# Patient Record
Sex: MALE | Race: WHITE | NOT HISPANIC OR LATINO | URBAN - METROPOLITAN AREA
[De-identification: names, ages, dates, MRNs, and addresses within clinical notes are randomized per-mention and may not be internally consistent; named-entity substitution may affect disease eponyms.]

---

## 2018-02-02 ENCOUNTER — OFFICE (OUTPATIENT)
Dept: URBAN - METROPOLITAN AREA CLINIC 33 | Facility: CLINIC | Age: 48
End: 2018-02-02

## 2018-02-02 VITALS
HEART RATE: 57 BPM | SYSTOLIC BLOOD PRESSURE: 102 MMHG | HEIGHT: 77 IN | WEIGHT: 213 LBS | DIASTOLIC BLOOD PRESSURE: 64 MMHG | TEMPERATURE: 97.9 F

## 2018-02-02 DIAGNOSIS — R11.2 NAUSEA WITH VOMITING, UNSPECIFIED: ICD-10-CM

## 2018-02-02 DIAGNOSIS — R19.7 DIARRHEA, UNSPECIFIED: ICD-10-CM

## 2018-02-02 PROCEDURE — 99204 OFFICE O/P NEW MOD 45 MIN: CPT

## 2018-02-02 PROCEDURE — 00031: CPT

## 2018-02-02 NOTE — SERVICEHPINOTES
TIARA HAN   is a   47   year old male who is being seen in consultation at the request of   KRYSTEN MCNEAL   for n/v and diarrhea. For years he has had episodes of n/v/d which last a couple days up to a week. He states the only thing that seems to make it go away is IV fluids and zofran. It occurs about once/year and he has 4-5 BMs/day, BSS type 4 and n/v 4x/day. The last episode was around Thanksgiving and then a few weeks afterwards. He has seen many doctors for this and states they all say its "viral". He denies nausea in between episodes. He actually saw us for this issue in 2009--resulting labs, stool studies, and colonoscopy/biopsies were all normal. He reports this has been occurring since he was a kid and was told it may be cyclic vomiting syndrome. He admits to heartburn 1-2x/week. No dysphagia. He has been doing the whole 30 diet with his wife and is currently gluten and dairy free. BMs are now normal, 1-2x/day, BSS type 4. He denies any blood in the stool or hematemesis. He denies caffeine intake, nonsmoker, or regular NSAID use. He was put on Prevacid and tried probiotics for this in the past and has not had any episodes when on either of these (although episodes are so infrequent that cannot correlate to anything specific).

## 2018-02-05 ENCOUNTER — INDEPENDENT LABORATORY (OUTPATIENT)
Dept: URBAN - METROPOLITAN AREA PATHOLOGY 17 | Facility: PATHOLOGY | Age: 48
End: 2018-02-05

## 2018-02-05 ENCOUNTER — OFFICE (OUTPATIENT)
Dept: URBAN - METROPOLITAN AREA CLINIC 32 | Facility: CLINIC | Age: 48
End: 2018-02-05

## 2018-02-05 VITALS
TEMPERATURE: 97.9 F | WEIGHT: 213 LBS | DIASTOLIC BLOOD PRESSURE: 70 MMHG | HEIGHT: 77 IN | DIASTOLIC BLOOD PRESSURE: 63 MMHG | RESPIRATION RATE: 14 BRPM | HEART RATE: 59 BPM | SYSTOLIC BLOOD PRESSURE: 102 MMHG | OXYGEN SATURATION: 97 % | DIASTOLIC BLOOD PRESSURE: 61 MMHG | OXYGEN SATURATION: 98 % | SYSTOLIC BLOOD PRESSURE: 98 MMHG | TEMPERATURE: 97.7 F | OXYGEN SATURATION: 99 % | DIASTOLIC BLOOD PRESSURE: 65 MMHG | HEART RATE: 66 BPM | RESPIRATION RATE: 12 BRPM | HEART RATE: 54 BPM | OXYGEN SATURATION: 96 % | DIASTOLIC BLOOD PRESSURE: 62 MMHG | HEART RATE: 63 BPM | RESPIRATION RATE: 16 BRPM | SYSTOLIC BLOOD PRESSURE: 107 MMHG | HEART RATE: 52 BPM | SYSTOLIC BLOOD PRESSURE: 99 MMHG | SYSTOLIC BLOOD PRESSURE: 104 MMHG | DIASTOLIC BLOOD PRESSURE: 69 MMHG | SYSTOLIC BLOOD PRESSURE: 101 MMHG

## 2018-02-05 DIAGNOSIS — R19.7 DIARRHEA, UNSPECIFIED: ICD-10-CM

## 2018-02-05 DIAGNOSIS — R11.2 NAUSEA WITH VOMITING, UNSPECIFIED: ICD-10-CM

## 2018-02-05 DIAGNOSIS — K21.0 GASTRO-ESOPHAGEAL REFLUX DISEASE WITH ESOPHAGITIS: ICD-10-CM

## 2018-02-05 PROCEDURE — 88342 IMHCHEM/IMCYTCHM 1ST ANTB: CPT

## 2018-02-05 PROCEDURE — 88313 SPECIAL STAINS GROUP 2: CPT

## 2018-02-05 PROCEDURE — 88312 SPECIAL STAINS GROUP 1: CPT

## 2018-02-05 PROCEDURE — 88305 TISSUE EXAM BY PATHOLOGIST: CPT

## 2018-02-05 RX ADMIN — LIDOCAINE HYDROCHLORIDE 40 MG: 20 INJECTION, SOLUTION INFILTRATION; PERINEURAL at 14:16

## 2018-02-05 RX ADMIN — LIDOCAINE HYDROCHLORIDE 60 MG: 20 INJECTION, SOLUTION INFILTRATION; PERINEURAL at 14:16

## 2018-02-05 RX ADMIN — LIDOCAINE HYDROCHLORIDE 40 MG: 20 INJECTION, SOLUTION INFILTRATION; PERINEURAL at 14:22

## 2018-02-14 LAB
CLOSTRIDIUM DIFFICILE TOXIN/GDH W/REFL TO PCR: (no result)
FECAL FAT, QUALITATIVE: (no result)
FECAL LEUKOCYTE STAIN: (no result)
GIARDIA AG, EIA, STOOL: (no result)
OVA AND PARASITES, CONC/PERM SMEAR, 3 SPEC: (no result)
SAL/SHIG/CAMPY, CULTURE AND SHIGA TOXIN, EIA W/RFL TO E.COLI, CULTURE: CAMPYLOBACTER, CULTURE: (no result)
SAL/SHIG/CAMPY, CULTURE AND SHIGA TOXIN, EIA W/RFL TO E.COLI, CULTURE: SALMONELLA AND SHIGELLA, CULTURE: (no result)
SAL/SHIG/CAMPY, CULTURE AND SHIGA TOXIN, EIA W/RFL TO E.COLI, CULTURE: SHIGA TOXINS, EIA W/RFL TO E.COLI O157 CULTURE: (no result)